# Patient Record
Sex: MALE
[De-identification: names, ages, dates, MRNs, and addresses within clinical notes are randomized per-mention and may not be internally consistent; named-entity substitution may affect disease eponyms.]

---

## 2019-10-16 ENCOUNTER — NON-APPOINTMENT (OUTPATIENT)
Age: 64
End: 2019-10-16

## 2019-10-16 ENCOUNTER — APPOINTMENT (OUTPATIENT)
Dept: HEART AND VASCULAR | Facility: CLINIC | Age: 64
End: 2019-10-16
Payer: COMMERCIAL

## 2019-10-16 VITALS
SYSTOLIC BLOOD PRESSURE: 117 MMHG | HEIGHT: 74 IN | DIASTOLIC BLOOD PRESSURE: 60 MMHG | BODY MASS INDEX: 21.56 KG/M2 | HEART RATE: 83 BPM | WEIGHT: 168 LBS

## 2019-10-16 DIAGNOSIS — I48.0 PAROXYSMAL ATRIAL FIBRILLATION: ICD-10-CM

## 2019-10-16 DIAGNOSIS — Z78.9 OTHER SPECIFIED HEALTH STATUS: ICD-10-CM

## 2019-10-16 PROBLEM — Z00.00 ENCOUNTER FOR PREVENTIVE HEALTH EXAMINATION: Status: ACTIVE | Noted: 2019-10-16

## 2019-10-16 PROCEDURE — 93000 ELECTROCARDIOGRAM COMPLETE: CPT

## 2019-10-16 PROCEDURE — 99205 OFFICE O/P NEW HI 60 MIN: CPT | Mod: 25

## 2019-10-16 RX ORDER — BUPROPION HYDROCHLORIDE 300 MG/1
300 TABLET, EXTENDED RELEASE ORAL
Refills: 0 | Status: ACTIVE | COMMUNITY
Start: 2019-10-16

## 2019-10-16 RX ORDER — TAMSULOSIN HYDROCHLORIDE 0.4 MG/1
0.4 CAPSULE ORAL
Refills: 0 | Status: ACTIVE | COMMUNITY
Start: 2019-10-16

## 2019-10-16 RX ORDER — ATORVASTATIN CALCIUM 20 MG/1
20 TABLET, FILM COATED ORAL
Refills: 0 | Status: ACTIVE | COMMUNITY
Start: 2019-10-16

## 2019-10-16 NOTE — PHYSICAL EXAM
[General Appearance - Well Developed] : well developed [Normal Appearance] : normal appearance [Well Groomed] : well groomed [General Appearance - Well Nourished] : well nourished [No Deformities] : no deformities [General Appearance - In No Acute Distress] : no acute distress [Normal Conjunctiva] : the conjunctiva exhibited no abnormalities [Normal Oral Mucosa] : normal oral mucosa [Normal Jugular Venous V Waves Present] : normal jugular venous V waves present [5th Left ICS - MCL] : palpated at the 5th LICS in the midclavicular line [Normal] : normal [No Precordial Heave] : no precordial heave was noted [Normal Rate] : normal [Rhythm Regular] : regular [Normal S1] : normal S1 [Normal S2] : normal S2 [No Murmur] : no murmurs heard [No Pitting Edema] : no pitting edema present [] : no respiratory distress [Respiration, Rhythm And Depth] : normal respiratory rhythm and effort [Exaggerated Use Of Accessory Muscles For Inspiration] : no accessory muscle use [Auscultation Breath Sounds / Voice Sounds] : lungs were clear to auscultation bilaterally [Abnormal Walk] : normal gait [Gait - Sufficient For Exercise Testing] : the gait was sufficient for exercise testing [Cyanosis, Localized] : no localized cyanosis [Skin Color & Pigmentation] : normal skin color and pigmentation [Oriented To Time, Place, And Person] : oriented to person, place, and time [Impaired Insight] : insight and judgment were intact [Affect] : the affect was normal [Mood] : the mood was normal [No Anxiety] : not feeling anxious [Apical Thrill] : no thrill palpable at the apex [Click] : no click [Distant] : the heart sounds were ~L not distant [Pericardial Rub] : no pericardial rub

## 2019-10-16 NOTE — REASON FOR VISIT
[Initial Evaluation] : an initial evaluation of [Atrial Fibrillation] : atrial fibrillation [FreeTextEntry1] : 2%

## 2019-10-16 NOTE — HISTORY OF PRESENT ILLNESS
[FreeTextEntry1] : 64 year old male with paroxysmal atrial fibrillation, who presents for initial evaluation.\par \par He states his first episode of palpitations was in about 2016.  He had an episode that was sustained and he went to [facility] and was told he had atrial fibrillation and self converted.  He states the only trigger that may bring on an episode is stress.  It can occur at rest or with activity.  Episodes usually last an hour or less.  He experiences palpitations.  No chest pain, syncope, SOB.  He has occasional positional lightheadedness.  No bleeding issues. \par  Recent event monitor showed 2.1% afib burden and fast ventricular rates to 170s.\par

## 2019-10-16 NOTE — DISCUSSION/SUMMARY
[FreeTextEntry1] : 64 year old male with paroxysmal atrial fibrillation, who presents for initial evaluation.  We discussed in great detail what atrial fibrillation is, the natural progression of this arrhythmia and the association with stroke.   HIs CHADS score is zero and he is off anticoagulation.  He is symptomatic when he goes into atrial fibrillation with rapid rates.  We discussed treatment options including rate control, antiarrhythmic medications and an ablation.  We discussed an ablation in detail including risks, benefits and alternatives.   I have quoted him an approximately 70-80% chance for success and a 1:700 chance of major complication related to the procedure.  Risks including, but not limited to; infection, vascular injury, cardiac perforation, TE/CVA, phrenic nerve injury were discussed.  All questions answered.  HE would like to consider this.  In the mean time I have asked him to start a low dose beta blocker to help with his symptoms and rapid heart rates.  He will follow up in 2-3 months or sooner if needed and knows to call with any questions or concerns.  \par

## 2019-11-25 RX ORDER — METOPROLOL SUCCINATE 25 MG/1
25 TABLET, EXTENDED RELEASE ORAL
Qty: 30 | Refills: 3 | Status: DISCONTINUED | COMMUNITY
Start: 2019-10-16 | End: 2019-11-25

## 2020-12-21 RX ORDER — DILTIAZEM HYDROCHLORIDE 120 MG/1
120 CAPSULE, EXTENDED RELEASE ORAL DAILY
Qty: 90 | Refills: 1 | Status: ACTIVE | COMMUNITY
Start: 2019-11-25 | End: 1900-01-01

## 2021-11-03 ENCOUNTER — APPOINTMENT (OUTPATIENT)
Dept: OTOLARYNGOLOGY | Facility: CLINIC | Age: 66
End: 2021-11-03